# Patient Record
Sex: MALE | Race: WHITE | Employment: UNEMPLOYED | URBAN - METROPOLITAN AREA
[De-identification: names, ages, dates, MRNs, and addresses within clinical notes are randomized per-mention and may not be internally consistent; named-entity substitution may affect disease eponyms.]

---
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
## 2023-10-04 ENCOUNTER — HOSPITAL ENCOUNTER (EMERGENCY)
Age: 18
Discharge: HOME | End: 2023-10-04
Attending: EMERGENCY MEDICINE
Payer: COMMERCIAL

## 2023-10-04 ENCOUNTER — APPOINTMENT (OUTPATIENT)
Dept: GENERAL RADIOLOGY | Age: 18
End: 2023-10-04
Payer: COMMERCIAL

## 2023-10-04 VITALS
WEIGHT: 212 LBS | HEART RATE: 71 BPM | SYSTOLIC BLOOD PRESSURE: 151 MMHG | OXYGEN SATURATION: 100 % | RESPIRATION RATE: 16 BRPM | TEMPERATURE: 98 F | DIASTOLIC BLOOD PRESSURE: 82 MMHG

## 2023-10-04 DIAGNOSIS — S62.337A CLOSED DISPLACED FRACTURE OF NECK OF FIFTH METACARPAL BONE OF LEFT HAND, INITIAL ENCOUNTER: Primary | ICD-10-CM

## 2023-10-04 PROCEDURE — 73130 X-RAY EXAM OF HAND: CPT

## 2023-10-04 PROCEDURE — 99283 EMERGENCY DEPT VISIT LOW MDM: CPT

## 2023-10-04 PROCEDURE — 29125 APPL SHORT ARM SPLINT STATIC: CPT

## 2023-10-04 RX ORDER — IBUPROFEN 800 MG/1
800 TABLET ORAL
Qty: 30 TABLET | Refills: 0 | Status: SHIPPED | OUTPATIENT
Start: 2023-10-04

## 2023-10-04 NOTE — ED NOTES
Patient DCed from ED at this time. Discussed AVS, follow up, and scripts. They verbalized understanding. Reinforced that should symptoms persist or worsen to return to the ED. They verbalized understanding. Patient ambulated out of ED. RN thanked patient for choosing [facility]).         Sixto Romero RN  10/04/23 1346

## 2023-10-04 NOTE — ED PROVIDER NOTES
[address] [facility] ENCOUNTER      Pt Name: Kadeem Rodriguez  MRN: [id number]  [address] [facility] 2005  Date of evaluation: 10/4/2023  Provider: Kusum Camara MD    [address] [place] [address]       Chief Complaint   Patient presents with    Hand Pain     Hedda Asai and caught self on Saturday, no LOC. Pain in L pinky finger/hand          HISTORY OF PRESENT ILLNESS   (Location/Symptom, Timing/Onset, Context/Setting, Quality, Duration, Modifying Factors, Severity)  Note limiting factors. Kadeem Rodriguez is a 21 y.o. male with   Past Medical History:   Diagnosis Date    ADD (attention deficit disorder)     Anxiety     Asthma     Depression     who presents to the emergency department with the chief complaint of   Chief Complaint   Patient presents with    Hand Pain     Hedda Asai and caught self on Saturday, no LOC. Pain in L pinky finger/hand    . The patient comes in complaining of left hand pain. Patient claims that he slipped and fell on his left hand on Saturday and he claims he did not punch anyone. But the patient stated he fell with his left hand turned inward and hit the outside of his hand onto the ground. Patient complains of pain in his left fifth metacarpal.  The patient denies any other injuries or complaint he did not hit his head. He has no neck or back pain. Nursing Notes were reviewed. REVIEW OF SYSTEMS    (2-9 systems for level 4, 10 or more for level 5)     Review of Systems   Constitutional:  Negative for chills and fever. Musculoskeletal:         The patient has left hand pain       Except as noted above the remainder of the review of systems was reviewed and negative.        PAST MEDICAL HISTORY     Past Medical History:   Diagnosis Date    ADD (attention deficit disorder)     Anxiety     Asthma     Depression          SURGICAL HISTORY       Past Surgical History:   Procedure Laterality Date    ADENOIDECTOMY      TONSILLECTOMY      TYMPANOSTOMY TUBE

## 2023-10-04 NOTE — DISCHARGE INSTRUCTIONS
Ice, Elevation, ibuprofen as needed for pain. Wear the splint at all times. Call the orthopedic surgeons office tomorrow morning and get set up for follow-up.   It is really important that you get into them very soon as you likely need a reduction and/or surgery

## 2023-10-05 ENCOUNTER — TELEPHONE (OUTPATIENT)
Dept: ORTHOPEDIC SURGERY | Age: 18
End: 2023-10-05

## 2023-10-05 NOTE — TELEPHONE ENCOUNTER
NP LEFT 5TH METACARPAL FX // ED WEST XR 10/4    Patient referred to Dr. Neha Holliday from Emergency Department. Called patient in regards to scheduling an appointment to follow up with orthopedics. No answer. Left message for return call to schedule. [phone]. and Upon return call, patient can be scheduled at any location in an OPEN time slot. If none are available, OK to [facility] in half hour or on the hour.  PLEASE BOOK @ 9:15 AM AT [address] [place] [address] 10/6

## 2023-10-13 ENCOUNTER — ANESTHESIA EVENT (OUTPATIENT)
Dept: OPERATING ROOM | Age: 18
End: 2023-10-13
Payer: COMMERCIAL

## 2023-10-13 ENCOUNTER — TELEPHONE (OUTPATIENT)
Dept: ORTHOPEDIC SURGERY | Age: 18
End: 2023-10-13

## 2023-10-13 ENCOUNTER — OFFICE VISIT (OUTPATIENT)
Dept: ORTHOPEDIC SURGERY | Age: 18
End: 2023-10-13

## 2023-10-13 VITALS — WEIGHT: 212 LBS | BODY MASS INDEX: 34.07 KG/M2 | HEIGHT: 66 IN

## 2023-10-13 DIAGNOSIS — Z72.0 VAPES NICOTINE CONTAINING SUBSTANCE: ICD-10-CM

## 2023-10-13 DIAGNOSIS — S62.397A OTHER FRACTURE OF FIFTH METACARPAL BONE, LEFT HAND, INITIAL ENCOUNTER FOR CLOSED FRACTURE: Primary | ICD-10-CM

## 2023-10-13 NOTE — PROGRESS NOTES
[facility] Pre-Admission Testing Electronic Communication Worksheet for OR/ENDO Procedures        Patient: Caleb Mclaughlin    DOS: 10/16/23    Arrival Time: 0810    Surgery Time:1010    Meds to Bed:  [] YES    []  NO  maybe    Transportation Confirmed: [x] YES    []  NO    History and Physical:  [x] YES    []  NO  [] N/A  If yes, please list doctor or Urgent Care and date of H&P:   Per Surgeon  Additional Clearance(Cardiac, Pulmonary, etc):  [] YES    [x]  NO    Pre-Admission Testing Visit:  [] YES    [x]  NO If no, do labs/testing need to be done DOS?   [] YES    [x]  NO    Medication Reconciliation Complete:  [] YES    [x]  NO        Additional Notes:                Interview Complete: [x] YES    []  NO          Rox Boudreaux RN  3:16 PM

## 2023-10-13 NOTE — TELEPHONE ENCOUNTER
Auth: WINNIE  Date: 10/16/23   Reference # None  Spoke with: Online  Type of SX: Outpatient  Location: [facility]  CPT: 89271   DX: [phone]  SX area: [facility]  Insurance: Nate Bledsoe

## 2023-10-13 NOTE — PROGRESS NOTES
[address]  time____0810________        Surgery time____1010________    Take the following medications with a sip of water: Follow your MD/Surgeons pre-procedure instructions regarding your medications     Do not eat or drink anything after 12:00 midnight prior to your surgery. This includes water chewing gum, mints and ice chips. You may brush your teeth and gargle the morning of your surgery, but do not swallow the water     Please see your family doctor/pediatrician for a history and physical and/or concerning medications. Bring any test results/reports from your physicians office. If you are under the care of a heart doctor or specialist doctor, please be aware that you may be asked to them for clearance    You may be asked to stop blood thinners such as Coumadin, Plavix, Fragmin, Lovenox, etc., or any anti-inflammatories such as:  Aspirin, Ibuprofen, Advil, Naproxen prior to your surgery. We also ask that you stop any OTC medications such as fish oil, vitamin E, glucosamine, garlic, Multivitamins, COQ 10, etc.    We ask that you do not smoke 24 hours prior to surgery  We ask that you do not  drink any alcoholic beverages 24 hours prior to surgery     You must make arrangements for a responsible adult to take you home after your surgery. For your safety you will not be allowed to leave alone or drive yourself home. Your surgery will be cancelled if you do not have a ride home. Also for your safety, it is strongly suggested that someone stay with you the first 24 hours after your surgery. A parent or legal guardian must accompany a child scheduled for surgery and plan to stay at the hospital until the child is discharged. Please do not bring other children with you. For your comfort, please wear simple loose fitting clothing to the hospital.  Please do not bring valuables.     Do not wear any make-up or nail polish on your fingers or

## 2023-10-13 NOTE — PROGRESS NOTES
CHIEF COMPLAINT: Left hand pain/ 5th MC distal shaft displaced fracture. DATE OF INJURY: 10/4/2023    HISTORY:  Mr. Brando Tripathi 21 y.o.  male left handed presents today for the first visit for evaluation of a left hand injury which occurred when he fell. He is complaining of ulnar hand pain and swelling. This is better with elevation and worse with ROM. The pain is sharp and not radiating. No other complaint. He was seen at [facility], where he was evaluated and splinted and asked to see orthopedics    Past Medical History:   Diagnosis Date    ADD (attention deficit disorder)     Anxiety     Asthma     Depression        Past Surgical History:   Procedure Laterality Date    ADENOIDECTOMY      TONSILLECTOMY      TYMPANOSTOMY TUBE PLACEMENT Bilateral        Social History     Socioeconomic History    Marital status: Single     Spouse name: Not on file    Number of children: Not on file    Years of education: Not on file    Highest education level: Not on file   Occupational History    Not on file   Tobacco Use    Smoking status: Every Day    Smokeless tobacco: Never    Tobacco comments:     Uses Vape everyday   Substance and Sexual Activity    Alcohol use: No    Drug use: No    Sexual activity: Not on file   Other Topics Concern    Not on file   Social History Narrative    Not on file     Social Determinants of Health     Financial Resource Strain: Not on file   Food Insecurity: Not on file   Transportation Needs: Not on file   Physical Activity: Not on file   Stress: Not on file   Social Connections: Not on file   Intimate Partner Violence: Not on file   Housing Stability: Not on file       No family history on file.     Current Outpatient Medications on File Prior to Visit   Medication Sig Dispense Refill    ibuprofen (ADVIL;MOTRIN) 800 MG tablet Take 1 tablet by mouth 3 times daily (with meals) 30 tablet 0    ondansetron (ZOFRAN-ODT) 4 MG disintegrating tablet Take 1 tablet by mouth 3 times daily as needed

## 2023-10-16 ENCOUNTER — APPOINTMENT (OUTPATIENT)
Dept: GENERAL RADIOLOGY | Age: 18
End: 2023-10-16
Attending: ORTHOPAEDIC SURGERY
Payer: COMMERCIAL

## 2023-10-16 ENCOUNTER — ANESTHESIA (OUTPATIENT)
Dept: OPERATING ROOM | Age: 18
End: 2023-10-16
Payer: COMMERCIAL

## 2023-10-16 ENCOUNTER — HOSPITAL ENCOUNTER (OUTPATIENT)
Age: 18
Setting detail: OUTPATIENT SURGERY
Discharge: HOME | End: 2023-10-16
Attending: ORTHOPAEDIC SURGERY | Admitting: ORTHOPAEDIC SURGERY
Payer: COMMERCIAL

## 2023-10-16 ENCOUNTER — TELEPHONE (OUTPATIENT)
Dept: ORTHOPEDIC SURGERY | Age: 18
End: 2023-10-16

## 2023-10-16 VITALS
WEIGHT: 210 LBS | BODY MASS INDEX: 33.75 KG/M2 | HEART RATE: 89 BPM | SYSTOLIC BLOOD PRESSURE: 123 MMHG | TEMPERATURE: 96.8 F | DIASTOLIC BLOOD PRESSURE: 64 MMHG | OXYGEN SATURATION: 95 % | HEIGHT: 66 IN | RESPIRATION RATE: 16 BRPM

## 2023-10-16 DIAGNOSIS — S62.397A OTHER FRACTURE OF FIFTH METACARPAL BONE, LEFT HAND, INITIAL ENCOUNTER FOR CLOSED FRACTURE: Primary | ICD-10-CM

## 2023-10-16 PROCEDURE — 6370000000 HC RX 637 (ALT 250 FOR IP): Performed by: ANESTHESIOLOGY

## 2023-10-16 PROCEDURE — 2720000010 HC SURG SUPPLY STERILE: Performed by: ORTHOPAEDIC SURGERY

## 2023-10-16 PROCEDURE — 2709999900 HC NON-CHARGEABLE SUPPLY: Performed by: ORTHOPAEDIC SURGERY

## 2023-10-16 PROCEDURE — 3700000001 HC ADD 15 MINUTES (ANESTHESIA): Performed by: ORTHOPAEDIC SURGERY

## 2023-10-16 PROCEDURE — 3600000005 HC SURGERY LEVEL 5 BASE: Performed by: ORTHOPAEDIC SURGERY

## 2023-10-16 PROCEDURE — 6360000002 HC RX W HCPCS: Performed by: ANESTHESIOLOGY

## 2023-10-16 PROCEDURE — 6360000002 HC RX W HCPCS

## 2023-10-16 PROCEDURE — 73120 X-RAY EXAM OF HAND: CPT

## 2023-10-16 PROCEDURE — 7100000000 HC PACU RECOVERY - FIRST 15 MIN: Performed by: ORTHOPAEDIC SURGERY

## 2023-10-16 PROCEDURE — 6360000002 HC RX W HCPCS: Performed by: ORTHOPAEDIC SURGERY

## 2023-10-16 PROCEDURE — 7100000011 HC PHASE II RECOVERY - ADDTL 15 MIN: Performed by: ORTHOPAEDIC SURGERY

## 2023-10-16 PROCEDURE — 2580000003 HC RX 258: Performed by: ORTHOPAEDIC SURGERY

## 2023-10-16 PROCEDURE — C1713 ANCHOR/SCREW BN/BN,TIS/BN: HCPCS | Performed by: ORTHOPAEDIC SURGERY

## 2023-10-16 PROCEDURE — 7100000001 HC PACU RECOVERY - ADDTL 15 MIN: Performed by: ORTHOPAEDIC SURGERY

## 2023-10-16 PROCEDURE — 2580000003 HC RX 258: Performed by: STUDENT IN AN ORGANIZED HEALTH CARE EDUCATION/TRAINING PROGRAM

## 2023-10-16 PROCEDURE — A4217 STERILE WATER/SALINE, 500 ML: HCPCS | Performed by: ORTHOPAEDIC SURGERY

## 2023-10-16 PROCEDURE — 3600000015 HC SURGERY LEVEL 5 ADDTL 15MIN: Performed by: ORTHOPAEDIC SURGERY

## 2023-10-16 PROCEDURE — 7100000010 HC PHASE II RECOVERY - FIRST 15 MIN: Performed by: ORTHOPAEDIC SURGERY

## 2023-10-16 PROCEDURE — 2500000003 HC RX 250 WO HCPCS

## 2023-10-16 PROCEDURE — 3700000000 HC ANESTHESIA ATTENDED CARE: Performed by: ORTHOPAEDIC SURGERY

## 2023-10-16 DEVICE — BONE SCREW, T6
Type: IMPLANTABLE DEVICE | Site: HAND | Status: FUNCTIONAL
Brand: VARIAX

## 2023-10-16 DEVICE — LOCKING SCREW, T6
Type: IMPLANTABLE DEVICE | Site: HAND | Status: FUNCTIONAL
Brand: VARIAX

## 2023-10-16 DEVICE — 2.3 M VARIAX HAND LOCK T-PLATE, NARROW
Type: IMPLANTABLE DEVICE | Site: HAND | Status: FUNCTIONAL
Brand: VARIAX

## 2023-10-16 RX ORDER — KETAMINE HCL IN NACL, ISO-OSM 100MG/10ML
SYRINGE (ML) INJECTION PRN
Status: DISCONTINUED | OUTPATIENT
Start: 2023-10-16 | End: 2023-10-16 | Stop reason: SDUPTHER

## 2023-10-16 RX ORDER — DEXAMETHASONE SODIUM PHOSPHATE 4 MG/ML
INJECTION, SOLUTION INTRA-ARTICULAR; INTRALESIONAL; INTRAMUSCULAR; INTRAVENOUS; SOFT TISSUE PRN
Status: DISCONTINUED | OUTPATIENT
Start: 2023-10-16 | End: 2023-10-16 | Stop reason: SDUPTHER

## 2023-10-16 RX ORDER — CEPHALEXIN 500 MG/1
500 CAPSULE ORAL 4 TIMES DAILY
Qty: 20 CAPSULE | Refills: 0 | Status: SHIPPED | OUTPATIENT
Start: 2023-10-16 | End: 2023-10-21

## 2023-10-16 RX ORDER — SODIUM CHLORIDE 0.9 % (FLUSH) 0.9 %
5-40 SYRINGE (ML) INJECTION EVERY 12 HOURS SCHEDULED
Status: DISCONTINUED | OUTPATIENT
Start: 2023-10-16 | End: 2023-10-16 | Stop reason: HOSPADM

## 2023-10-16 RX ORDER — ONDANSETRON 2 MG/ML
4 INJECTION INTRAMUSCULAR; INTRAVENOUS
Status: DISCONTINUED | OUTPATIENT
Start: 2023-10-16 | End: 2023-10-16 | Stop reason: HOSPADM

## 2023-10-16 RX ORDER — PROPOFOL 10 MG/ML
INJECTION, EMULSION INTRAVENOUS PRN
Status: DISCONTINUED | OUTPATIENT
Start: 2023-10-16 | End: 2023-10-16 | Stop reason: SDUPTHER

## 2023-10-16 RX ORDER — TRAMADOL HYDROCHLORIDE 50 MG/1
50 TABLET ORAL
Status: COMPLETED | OUTPATIENT
Start: 2023-10-16 | End: 2023-10-16

## 2023-10-16 RX ORDER — SODIUM CHLORIDE 9 MG/ML
INJECTION, SOLUTION INTRAVENOUS PRN
Status: DISCONTINUED | OUTPATIENT
Start: 2023-10-16 | End: 2023-10-16 | Stop reason: HOSPADM

## 2023-10-16 RX ORDER — PHENYLEPHRINE HCL IN 0.9% NACL 1 MG/10 ML
SYRINGE (ML) INTRAVENOUS PRN
Status: DISCONTINUED | OUTPATIENT
Start: 2023-10-16 | End: 2023-10-16 | Stop reason: SDUPTHER

## 2023-10-16 RX ORDER — GLYCOPYRROLATE 0.2 MG/ML
INJECTION INTRAMUSCULAR; INTRAVENOUS PRN
Status: DISCONTINUED | OUTPATIENT
Start: 2023-10-16 | End: 2023-10-16 | Stop reason: SDUPTHER

## 2023-10-16 RX ORDER — MEPERIDINE HYDROCHLORIDE 25 MG/ML
12.5 INJECTION INTRAMUSCULAR; INTRAVENOUS; SUBCUTANEOUS ONCE
Status: COMPLETED | OUTPATIENT
Start: 2023-10-16 | End: 2023-10-16

## 2023-10-16 RX ORDER — FENTANYL CITRATE 0.05 MG/ML
50 INJECTION, SOLUTION INTRAMUSCULAR; INTRAVENOUS EVERY 5 MIN PRN
Status: COMPLETED | OUTPATIENT
Start: 2023-10-16 | End: 2023-10-16

## 2023-10-16 RX ORDER — TRAMADOL HYDROCHLORIDE 50 MG/1
50 TABLET ORAL EVERY 6 HOURS PRN
Qty: 12 TABLET | Refills: 0 | Status: SHIPPED | OUTPATIENT
Start: 2023-10-16 | End: 2023-10-19

## 2023-10-16 RX ORDER — SODIUM CHLORIDE 0.9 % (FLUSH) 0.9 %
5-40 SYRINGE (ML) INJECTION PRN
Status: DISCONTINUED | OUTPATIENT
Start: 2023-10-16 | End: 2023-10-16 | Stop reason: HOSPADM

## 2023-10-16 RX ORDER — FENTANYL CITRATE 50 UG/ML
INJECTION, SOLUTION INTRAMUSCULAR; INTRAVENOUS PRN
Status: DISCONTINUED | OUTPATIENT
Start: 2023-10-16 | End: 2023-10-16 | Stop reason: SDUPTHER

## 2023-10-16 RX ORDER — LIDOCAINE HYDROCHLORIDE 20 MG/ML
INJECTION, SOLUTION EPIDURAL; INFILTRATION; INTRACAUDAL; PERINEURAL PRN
Status: DISCONTINUED | OUTPATIENT
Start: 2023-10-16 | End: 2023-10-16 | Stop reason: SDUPTHER

## 2023-10-16 RX ORDER — MIDAZOLAM HYDROCHLORIDE 1 MG/ML
INJECTION INTRAMUSCULAR; INTRAVENOUS PRN
Status: DISCONTINUED | OUTPATIENT
Start: 2023-10-16 | End: 2023-10-16 | Stop reason: SDUPTHER

## 2023-10-16 RX ORDER — MEPERIDINE HYDROCHLORIDE 25 MG/ML
12.5 INJECTION INTRAMUSCULAR; INTRAVENOUS; SUBCUTANEOUS
Status: COMPLETED | OUTPATIENT
Start: 2023-10-16 | End: 2023-10-16

## 2023-10-16 RX ORDER — HYDROCODONE BITARTRATE AND ACETAMINOPHEN 5; 325 MG/1; MG/1
1 TABLET ORAL EVERY 6 HOURS PRN
Qty: 20 TABLET | Refills: 0 | Status: SHIPPED | OUTPATIENT
Start: 2023-10-16 | End: 2023-10-21

## 2023-10-16 RX ORDER — FENTANYL CITRATE 0.05 MG/ML
25 INJECTION, SOLUTION INTRAMUSCULAR; INTRAVENOUS EVERY 5 MIN PRN
Status: DISCONTINUED | OUTPATIENT
Start: 2023-10-16 | End: 2023-10-16 | Stop reason: HOSPADM

## 2023-10-16 RX ORDER — ONDANSETRON 2 MG/ML
INJECTION INTRAMUSCULAR; INTRAVENOUS PRN
Status: DISCONTINUED | OUTPATIENT
Start: 2023-10-16 | End: 2023-10-16 | Stop reason: SDUPTHER

## 2023-10-16 RX ORDER — BUPIVACAINE HYDROCHLORIDE 5 MG/ML
INJECTION, SOLUTION EPIDURAL; INTRACAUDAL
Status: COMPLETED | OUTPATIENT
Start: 2023-10-16 | End: 2023-10-16

## 2023-10-16 RX ADMIN — SODIUM CHLORIDE: 9 INJECTION, SOLUTION INTRAVENOUS at 08:23

## 2023-10-16 RX ADMIN — Medication 200 MCG: at 11:06

## 2023-10-16 RX ADMIN — FENTANYL CITRATE 50 MCG: 0.05 INJECTION, SOLUTION INTRAMUSCULAR; INTRAVENOUS at 11:57

## 2023-10-16 RX ADMIN — Medication 200 MCG: at 11:03

## 2023-10-16 RX ADMIN — Medication 200 MCG: at 11:09

## 2023-10-16 RX ADMIN — FENTANYL CITRATE 50 MCG: 0.05 INJECTION, SOLUTION INTRAMUSCULAR; INTRAVENOUS at 12:12

## 2023-10-16 RX ADMIN — ONDANSETRON 4 MG: 2 INJECTION INTRAMUSCULAR; INTRAVENOUS at 10:37

## 2023-10-16 RX ADMIN — CEFAZOLIN 2000 MG: 2 INJECTION, POWDER, FOR SOLUTION INTRAMUSCULAR; INTRAVENOUS at 10:37

## 2023-10-16 RX ADMIN — GLYCOPYRROLATE 0.2 MG: 0.2 INJECTION INTRAMUSCULAR; INTRAVENOUS at 10:50

## 2023-10-16 RX ADMIN — FENTANYL CITRATE 50 MCG: 0.05 INJECTION, SOLUTION INTRAMUSCULAR; INTRAVENOUS at 11:43

## 2023-10-16 RX ADMIN — TRAMADOL HYDROCHLORIDE 50 MG: 50 TABLET ORAL at 13:02

## 2023-10-16 RX ADMIN — MIDAZOLAM 2 MG: 1 INJECTION INTRAMUSCULAR; INTRAVENOUS at 10:32

## 2023-10-16 RX ADMIN — FENTANYL CITRATE 50 MCG: 50 INJECTION INTRAMUSCULAR; INTRAVENOUS at 10:34

## 2023-10-16 RX ADMIN — FENTANYL CITRATE 50 MCG: 50 INJECTION INTRAMUSCULAR; INTRAVENOUS at 10:37

## 2023-10-16 RX ADMIN — Medication 10 MG: at 10:52

## 2023-10-16 RX ADMIN — LIDOCAINE HYDROCHLORIDE 100 MG: 20 INJECTION, SOLUTION EPIDURAL; INFILTRATION; INTRACAUDAL; PERINEURAL at 10:34

## 2023-10-16 RX ADMIN — Medication 10 MG: at 11:00

## 2023-10-16 RX ADMIN — MEPERIDINE HYDROCHLORIDE 12.5 MG: 25 INJECTION INTRAMUSCULAR; INTRAVENOUS; SUBCUTANEOUS at 11:50

## 2023-10-16 RX ADMIN — DEXAMETHASONE SODIUM PHOSPHATE 8 MG: 4 INJECTION, SOLUTION INTRAMUSCULAR; INTRAVENOUS at 10:37

## 2023-10-16 RX ADMIN — PROPOFOL 250 MG: 10 INJECTION, EMULSION INTRAVENOUS at 10:34

## 2023-10-16 RX ADMIN — Medication 10 MG: at 10:50

## 2023-10-16 RX ADMIN — MEPERIDINE HYDROCHLORIDE 12.5 MG: 25 INJECTION INTRAMUSCULAR; INTRAVENOUS; SUBCUTANEOUS at 11:38

## 2023-10-16 RX ADMIN — Medication 100 MCG: at 11:01

## 2023-10-16 RX ADMIN — FENTANYL CITRATE 50 MCG: 0.05 INJECTION, SOLUTION INTRAMUSCULAR; INTRAVENOUS at 12:24

## 2023-10-16 ASSESSMENT — PAIN SCALES - GENERAL
PAINLEVEL_OUTOF10: 6
PAINLEVEL_OUTOF10: 7
PAINLEVEL_OUTOF10: 8
PAINLEVEL_OUTOF10: 0
PAINLEVEL_OUTOF10: 4
PAINLEVEL_OUTOF10: 10
PAINLEVEL_OUTOF10: 9
PAINLEVEL_OUTOF10: 10

## 2023-10-16 ASSESSMENT — PAIN DESCRIPTION - DESCRIPTORS
DESCRIPTORS: SHOOTING
DESCRIPTORS: ACHING;SHARP
DESCRIPTORS: SHOOTING

## 2023-10-16 ASSESSMENT — PAIN DESCRIPTION - PAIN TYPE
TYPE: SURGICAL PAIN

## 2023-10-16 ASSESSMENT — PAIN - FUNCTIONAL ASSESSMENT
PAIN_FUNCTIONAL_ASSESSMENT: PREVENTS OR INTERFERES SOME ACTIVE ACTIVITIES AND ADLS
PAIN_FUNCTIONAL_ASSESSMENT: 0-10
PAIN_FUNCTIONAL_ASSESSMENT: PREVENTS OR INTERFERES SOME ACTIVE ACTIVITIES AND ADLS

## 2023-10-16 ASSESSMENT — PAIN DESCRIPTION - ORIENTATION
ORIENTATION: LEFT

## 2023-10-16 ASSESSMENT — LIFESTYLE VARIABLES: SMOKING_STATUS: 1

## 2023-10-16 ASSESSMENT — PAIN DESCRIPTION - LOCATION
LOCATION: HAND;ARM;WRIST
LOCATION: WRIST
LOCATION: HAND
LOCATION: WRIST
LOCATION: HAND

## 2023-10-16 ASSESSMENT — PAIN DESCRIPTION - FREQUENCY
FREQUENCY: CONTINUOUS

## 2023-10-16 ASSESSMENT — PAIN DESCRIPTION - ONSET
ONSET: ON-GOING

## 2023-10-16 ASSESSMENT — ENCOUNTER SYMPTOMS: SHORTNESS OF BREATH: 0

## 2023-10-16 NOTE — ANESTHESIA POSTPROCEDURE EVALUATION
Department of Anesthesiology  Postprocedure Note    Patient: Sara Balbuena  MRN: [id number]  YOB: 2005  Date of evaluation: 10/16/2023      Procedure Summary     Date: 10/16/23 Room / Location: [address]    Anesthesia Start: 1032 Anesthesia Stop: 4588    Procedure: OPEN REDUCTION INTERNAL FIXATION LEFT FIFTH METACARPAL (Left: Hand) Diagnosis:       Closed fracture of fifth metacarpal bone of left hand, unspecified fracture morphology, initial encounter      (Closed fracture of fifth metacarpal bone of left hand, unspecified fracture morphology, initial encounter Rosalina Will)    Surgeons: Sagar Sanders MD Responsible Provider: Aamir Lovett MD    Anesthesia Type: General ASA Status: 2          Anesthesia Type: General    Wisam Phase I: Wisam Score: 10    Wisam Phase II: Wisam Score: 10      Anesthesia Post Evaluation    Patient location during evaluation: PACU  Patient participation: complete - patient participated  Level of consciousness: awake and alert  Airway patency: patent  Nausea & Vomiting: no nausea and no vomiting  Complications: no  Cardiovascular status: hemodynamically stable  Respiratory status: acceptable  Hydration status: stable  Pain management: adequate

## 2023-10-16 NOTE — TELEPHONE ENCOUNTER
Spoke to the patient mother. The son was in the background. Notified the mother and patient, patient can return to work with light duty restrictions. Advised the patient is no able to drive to and from work while under pain medication. No use of the left hand, no ladders, must wear splint, not to operate moving machinery. Patient agreed to terms.

## 2023-10-16 NOTE — TELEPHONE ENCOUNTER
General Question     Subject: patient call and he would like to know if he can come to the office to  some Return to Work Forms? He just need a call back. Please Advise.   Patient [facility] Number: [phone]

## 2023-10-16 NOTE — BRIEF OP NOTE
Brief Postoperative Note      Patient: Dillon Perez  YOB: 2005  MRN: [id number]    Date of Procedure: 10/16/2023    Pre-Op Diagnosis Codes:     * Closed fracture of fifth metacarpal bone of left hand, unspecified fracture morphology, initial encounter [S62.307A]    Post-Op Diagnosis: Same       Procedure(s):  OPEN REDUCTION INTERNAL FIXATION LEFT FIFTH METACARPAL    Surgeon(s):  Yesika Garrido MD    Assistant:  Physician Assistant: Willow Londono CNP    Anesthesia: General    Estimated Blood Loss (mL): Minimal    Complications: None    Specimens:   * No specimens in log *    Implants:  Implant Name Type Inv. Item Serial No.  Lot No. LRB No. Used Action   PLATE BNE M C7.1KG 6 H HND TI SOFIYA T LUZ MARIA FOR 2.3MM SCR - [id number]  PLATE BNE M A7.8YN 6 H HND TI SOFIYA T LUZ MARIA FOR 2.3MM SCR  [employer] [facility]  Left 1 Implanted   SCREW T6 BONE 2.3XL8MM - [id number]  SCREW T6 BONE 2.3XL8MM  [employer] [facility]  Left 1 Implanted   SCREW BNE L10MM DIA2. 3MM T6 VAR[employer] - [id number]  SCREW BNE L10MM DIA2. 3MM T6 [employer]  [employer] [facility]  Left 2 Implanted   SCREW SPINAL ASCENT 3.5X10MM - [id number]  SCREW SPINAL ASCENT 3.5X10MM  [employer] [facility]  Left 1 Implanted   SCREW BONE VARIAX LK 2.3X11MM - [id number]  SCREW BONE VARIAX LK 2.3X11MM  [employer] [facility]  Left 2 Implanted         Drains: * No LDAs found *    Findings: Same.       Electronically signed by Yesika Garrido MD on 10/16/2023 at 2:56 PM

## 2023-10-16 NOTE — DISCHARGE INSTRUCTIONS
Post op instruction:  1- D/C home  2- Dx Left hand pain/ 5th MC distal shaft displaced fracture. 3- NWB left hand  4- Elevation surgical site, with ice  5- Keep splint dry and clean  6- F/U in 2 weeks.        Massiel Bailon MD, 10/16/2023

## 2023-10-16 NOTE — TELEPHONE ENCOUNTER
General Question     Subject: L HAND PAIN SX 10-  Patient and /or Facility Request: [facility]  Contact Number: [phone]    PATIENT MOTHER AMANDA CALLED IN TO SEE WHAT SHE CAN DO FOR THE PATIENT PAIN. .. PATIENT IS IN A LOT OF PAIN AND THE MEDS ARE NOT WORKING. Diamond Hamman      PLEASE ADVISE

## 2023-10-16 NOTE — PROGRESS NOTES
Patient to PACU from OR. Pt awakens easily on arrival. VS stable (see flowsheet) on 2L NC. Surgical dressing clean, dry and intact x1.

## 2023-10-16 NOTE — H&P
Preoperative H&P Update    The patient's History and Physical in the medical record from 10/13/2023 was reviewed by me today. Past Medical History:   Diagnosis Date    ADD (attention deficit disorder)     Anxiety     Asthma     Depression      Past Surgical History:   Procedure Laterality Date    ADENOIDECTOMY      TONSILLECTOMY      TYMPANOSTOMY TUBE PLACEMENT Bilateral      No current facility-administered medications on file prior to encounter. Current Outpatient Medications on File Prior to Encounter   Medication Sig Dispense Refill    ibuprofen (ADVIL;MOTRIN) 800 MG tablet Take 1 tablet by mouth 3 times daily (with meals) (Patient not taking: Reported on 10/13/2023) 30 tablet 0    ondansetron (ZOFRAN-ODT) 4 MG disintegrating tablet Take 1 tablet by mouth 3 times daily as needed for Nausea or Vomiting (Patient not taking: Reported on 10/13/2023) 21 tablet 0    sertraline (ZOLOFT) 100 MG tablet Take 100 mg by mouth daily (Patient not taking: Reported on 10/13/2023)         No Known Allergies   I reviewed the HPI, medications, allergies, reason for surgery, diagnosis and treatment plan and there has been no change. The patient was evaluated by me today. Physical exam findings for this update include: There were no vitals filed for this visit. Airway is intact  Chest: chest clear, no wheezing, rales, normal symmetric air entry, no tachypnea, retractions or cyanosis  Heart: regular rate and rhythm ; heart sounds normal  Findings on exam of the body region where surgery is to be performed include:  Left hand pain/ 5th MC distal shaft displaced fracture.     Electronically signed by Warren Henderson MD on 10/16/2023 at 8:10 AM

## 2023-10-16 NOTE — PROGRESS NOTES
VSS on room air. Surgical dressing clean, dry and intact. Pt states pain has decreased to 4-5/10. Pt satisfied. Pt to phase 2 care.

## 2023-10-16 NOTE — TELEPHONE ENCOUNTER
Patient states he is 9/10 pain. States the tramadol is not reducing his pain level at all. In the meantime, The information below was reviewed. All questions were answered. Having pain after surgery is normal. While pain medication will help control post-operative pain, you will not be entirely pain free after surgery. Taking your medication as prescribed, elevating the injured area above heart level to limit swelling, and applying ice to exposed skin near the area (for 15 minutes, as often as every hour) will help manage your pain. Typically, the first 48-72 hours after surgery are the most uncomfortable, but the pain will start to diminish. If increased pain is experienced after following these steps, the office should be notified. Correspondence sent to Dr. Ivon Morales in surgery and routed with alternative pain medication. If appropriate Dr. Ivon Morales can sign and confirmed the pharmacy with patient.

## 2023-10-17 NOTE — OP NOTE
to draping in a sterile fashion, exposure of surgical area, controlling bleeding, retracting and protecting important structures, achieve and maintain bone reduction and surgical wound closure with dressing application. POSTOPERATIVE PLAN:  The patient will be discharged home. He will be  nonweightbearing, but we can start range of motion in two weeks.         Moris Monroe MD    D: 10/16/2023 15:01:38       T: 10/16/2023 15:04:15     [email or web address]  Job#: [id number]     Doc#: [id number]    CC:

## 2023-10-18 ENCOUNTER — TELEPHONE (OUTPATIENT)
Dept: ORTHOPEDIC SURGERY | Age: 18
End: 2023-10-18

## 2023-10-18 NOTE — TELEPHONE ENCOUNTER
Note was placed on 10/16/2023. Patients mother was instructed the note can be picked up at any [address] [facility] facility.

## 2023-10-18 NOTE — TELEPHONE ENCOUNTER
General Question     Subject: Aneta Fleming  Patient and /or Facility Request: DR. GAMBOA FOR HIS LT HAND   Contact Number: [phone]      PT GRAND MOTHER CALLING IN DUE TO PT NEEDING A DOCTORS NOTE TO RETURN BACK TO WORK TOMORROW. PT HAD SX ON 10- ON HIS LT HAND     PLEASE CALL BACK REGARDING THE MATTER.

## 2023-11-01 ENCOUNTER — TELEPHONE (OUTPATIENT)
Dept: ORTHOPEDIC SURGERY | Age: 18
End: 2023-11-01

## 2023-11-01 ENCOUNTER — OFFICE VISIT (OUTPATIENT)
Dept: ORTHOPEDIC SURGERY | Age: 18
End: 2023-11-01

## 2023-11-01 VITALS — WEIGHT: 210.1 LBS | HEIGHT: 69 IN | BODY MASS INDEX: 31.12 KG/M2

## 2023-11-01 DIAGNOSIS — S62.397A OTHER FRACTURE OF FIFTH METACARPAL BONE, LEFT HAND, INITIAL ENCOUNTER FOR CLOSED FRACTURE: Primary | ICD-10-CM

## 2023-11-01 DIAGNOSIS — F17.200 CURRENT SMOKER: ICD-10-CM

## 2023-11-01 NOTE — PROGRESS NOTES
DIAGNOSIS:  Left hand 5th MC distal shaft fracture, status post ORIF. DATE OF SURGERY:  10/16/2023. HISTORY OF PRESENT ILLNESS:  Mr. Humberto Medrano 21 y.o.  male left handed returns today  for 2 weeks postoperative visit. The patient denies any significant pain in the left hand. No numbness or tingling sensation. No fever or Chills. He  is in a splint. PHYSICAL EXAMINATION:  The incision is completely healed . No signs of any erythema or drainage. He has no pain with the active or passive range of motion of the left hand, but decrease ROM. He  has intact sensation, distally, and he  is neurovascularly intact. IMAGING:  Three views left hand showed anatomic alignment of left 5th MC, plate and screws in good position, no loosening. IMPRESSION:  2 weeks out from left 5th MC ORIF and doing very well. PLAN:  I have told the patient to work on ROM, as well as strengthening exercises. A removable wrist brace applied. No heavy impact activities. The patient will come back for a follow up in 6 weeks. At that time, we will take 3 views of the left hand. PT if needed then. The patient smokes cigarettes, and we discussed with the patient the risks of smoking on general health and also on bone and soft tissue healing (delay and non-union), and promised to cut down or stop smoking. Smoking: Educated the patient regarding the hazards of smoking and that it harms their body in many ways. It increases the chance of developing heart disease, lung disease, cancer, and other health problems including poor bone and wound healing. The importance of smoking cessation for optimal bone and wound healing was stressed. This was communicated verbally, 5 Minutes.       Trinity Cifuentes MD

## 2023-11-01 NOTE — TELEPHONE ENCOUNTER
General Question     Subject: WORK RELEASE FORM   Patient and /or Facility Request: Isauro Mcnair  Contact Number: [phone]     REGARDING THE WORK RELEASE FORM- THE Pt WAS TOLD HE WILL GET THE FORM IN 6 WEEKS. IS THAT DATE FROM TODAY, OR FROM THE DAY OF THE SX ON 10/16?      PLEASE CALL TO [employer] SO THE Pt CAN PASS ALONG THE INFO TO HIS EMPLOYER AND THEY CAN MAKE PLANS WHEN HE MAY RETURN.

## 2023-11-01 NOTE — TELEPHONE ENCOUNTER
[phone]  Belongs to mother, NOT ON HIPAA.     [phone]  belongs to grandmother, NOT ON HIPAA    Patient will need to call office.     Letter from 11/1/23: may return to light duty 11/2/23 with the following restrictions: no work involving left hand.     Restrictions in place for 6 weeks.     PATIENT IT NOT PERMITTED TO RETURN TO WORK FULL DUTY NO RESTRICTIONS PER OLIVIA SCHREIBER

## 2023-11-02 NOTE — TELEPHONE ENCOUNTER
Final attempt to reach patient thru grandmothers phone. No HIPAA on file. Did not leave message on grandmothers phone.       Patient is NOT allowed to return to work full duty with no restrictions. Patient is allowed to return to work so long as he is not using the hand.

## 2023-12-01 ENCOUNTER — OFFICE VISIT (OUTPATIENT)
Dept: ORTHOPEDIC SURGERY | Age: 18
End: 2023-12-01

## 2023-12-01 DIAGNOSIS — S62.397A OTHER FRACTURE OF FIFTH METACARPAL BONE, LEFT HAND, INITIAL ENCOUNTER FOR CLOSED FRACTURE: Primary | ICD-10-CM

## 2024-10-01 ENCOUNTER — HOSPITAL ENCOUNTER (EMERGENCY)
Age: 19
Discharge: HOME | End: 2024-10-01

## (undated) DEVICE — SUTURE VCRL + SZ 3-0 L18IN ABSRB UD SH 1/2 CIR TAPERCUT NDL VCP864D

## (undated) DEVICE — DRILL, WL 27MM, AO-SHAFT: Brand: PROFYLE

## (undated) DEVICE — C-ARM PACK: Brand: C-ARM COVER

## (undated) DEVICE — SOLUTION IRRIG 1000ML 0.9% SOD CHL USP POUR PLAS BTL

## (undated) DEVICE — GLOVE SURG SZ 65 THK91MIL LTX FREE SYN POLYISOPRENE

## (undated) DEVICE — MERCY HEALTH WEST TURNOVER: Brand: MEDLINE INDUSTRIES, INC.

## (undated) DEVICE — NEPTUNE E-SEP SMOKE EVACUATION PENCIL, COATED, 70MM BLADE, PUSH BUTTON SWITCH: Brand: NEPTUNE E-SEP

## (undated) DEVICE — GOWN SIRUS NONREIN XL W/TWL: Brand: MEDLINE INDUSTRIES, INC.

## (undated) DEVICE — GLOVE SURG SZ 8 CRM LTX FREE POLYISOPRENE POLYMER BEAD ANTI

## (undated) DEVICE — DRESSING,GAUZE,XEROFORM,CURAD,1"X8",ST: Brand: CURAD

## (undated) DEVICE — GLOVE SURG SZ 65 L12IN FNGR THK79MIL GRN LTX FREE

## (undated) DEVICE — PADDING,UNDERCAST,COTTON, 4"X4YD STERILE: Brand: MEDLINE

## (undated) DEVICE — STRIP,CLOSURE,WOUND,MEDI-STRIP,1/2X4: Brand: MEDLINE

## (undated) DEVICE — SPLINT ORTH W3XL12IN LAYERED FBRGLS FOAM PD BRTH BK MOLD

## (undated) DEVICE — TINCTURE BENZOIN SKIN STERI-STRIP VIAL

## (undated) DEVICE — K WIRE FIX L229MM DIA1.1MM STYL 6 S STL DBL DMND PNT
Type: IMPLANTABLE DEVICE | Site: HAND | Status: NON-FUNCTIONAL
Removed: 2023-10-16

## (undated) DEVICE — SPLINT ORTH W4XL15IN LAYERED FBRGLS FOAM PD BRTH BK MOLD

## (undated) DEVICE — BANDAGE COMPR W4INXL15FT BGE E SGL LAYERED CLP CLSR

## (undated) DEVICE — HAND AND ELBOW: Brand: MEDLINE INDUSTRIES, INC.

## (undated) DEVICE — SUTURE MCRYL + SZ 4-0 L18IN ABSRB UD L19MM PS-2 3/8 CIR MCP496G

## (undated) DEVICE — BASIC SINGLE BASIN 1-LF: Brand: MEDLINE INDUSTRIES, INC.